# Patient Record
Sex: FEMALE | Race: WHITE | NOT HISPANIC OR LATINO | Employment: OTHER | URBAN - METROPOLITAN AREA
[De-identification: names, ages, dates, MRNs, and addresses within clinical notes are randomized per-mention and may not be internally consistent; named-entity substitution may affect disease eponyms.]

---

## 2017-11-27 ENCOUNTER — NEW PATIENT COMPREHENSIVE (OUTPATIENT)
Dept: URBAN - METROPOLITAN AREA CLINIC 43 | Facility: CLINIC | Age: 57
End: 2017-11-27

## 2017-11-27 DIAGNOSIS — H40.013: ICD-10-CM

## 2017-11-27 DIAGNOSIS — H25.812: ICD-10-CM

## 2017-11-27 DIAGNOSIS — H25.811: ICD-10-CM

## 2017-11-27 PROCEDURE — 92133 CPTRZD OPH DX IMG PST SGM ON: CPT

## 2017-11-27 PROCEDURE — 92015 DETERMINE REFRACTIVE STATE: CPT

## 2017-11-27 PROCEDURE — 99204 OFFICE O/P NEW MOD 45 MIN: CPT

## 2017-11-27 ASSESSMENT — VISUAL ACUITY
OS_SC: 20/60-1
OD_SC: 20/60-1
OS_CC: J1
OS_CC: 20/30-2
OD_CC: J1
OD_CC: 20/40
OS_SC: J1
OD_SC: J1

## 2017-11-27 ASSESSMENT — TONOMETRY
OD_IOP_MMHG: 21
OS_IOP_MMHG: 21

## 2017-12-29 NOTE — PATIENT DISCUSSION
RX given today, but suggested pt F/U with Dr. Balta Levy as set and have him recheck the rX for diplopia, etc.

## 2018-01-03 NOTE — PATIENT DISCUSSION
RX given today, but suggested pt F/U with Dr. Alyson Libman as set and have him recheck the rX for diplopia, etc.

## 2018-04-19 NOTE — PATIENT DISCUSSION
RX given today, but suggested pt F/U with Dr. Giorgi Bowles as set and have him recheck the rX for diplopia, etc.

## 2018-06-29 NOTE — PATIENT DISCUSSION
No signs of physical ocular involvement. Patient has episodes of burning and stinging in OD that may coincide with her episodes of pain in her back. Patient is currently taking a modified dosage of Lyrica. Recommend an NSAID eye drop to help with symptoms when they occur. Prescription given for Ilevro to use as needed when pain occurs.

## 2018-06-29 NOTE — PATIENT DISCUSSION
Discussed presence of ERM. Recommended observation for now. Can consider surgical intervention in the future if the ERM progresses and the patient becomes more symptomatic.

## 2020-01-14 ENCOUNTER — ESTABLISHED COMPREHENSIVE EXAM (OUTPATIENT)
Dept: URBAN - METROPOLITAN AREA CLINIC 43 | Facility: CLINIC | Age: 60
End: 2020-01-14

## 2020-01-14 DIAGNOSIS — H52.7: ICD-10-CM

## 2020-01-14 PROCEDURE — 92015 DETERMINE REFRACTIVE STATE: CPT

## 2020-01-14 PROCEDURE — 92014 COMPRE OPH EXAM EST PT 1/>: CPT

## 2020-01-14 ASSESSMENT — VISUAL ACUITY
OD_CC: 20/20-1
OS_CC: J1-
OS_SC: 20/70-1
OD_BAT: 20/40
OD_CC: J1
OD_SC: 20/40
OS_BAT: 20/40
OS_SC: J2
OS_CC: 20/25-2
OD_SC: J2

## 2020-01-14 ASSESSMENT — TONOMETRY
OD_IOP_MMHG: 18
OS_IOP_MMHG: 20

## 2021-06-09 NOTE — PROCEDURE NOTE: CLINICAL
PROCEDURE NOTE: Punctal Plugs, Silicone #1 OS. Diagnosis: Dry Eye Syndrome. Anesthesia: Topical. Prior to treatment, the risks/benefits/alternatives were discussed. The patient wished to proceed with procedure. One drop of proparacaine was placed and a drop of lidocaine gel was placed over the puncta. 0.6 mm permanent silicone plugs were inserted in LLL. Patient tolerated procedure well. There were no complications. Post procedure instructions given. Janet Ojeda

## 2023-05-03 ENCOUNTER — PREPPED CHART (OUTPATIENT)
Dept: URBAN - METROPOLITAN AREA CLINIC 46 | Facility: CLINIC | Age: 63
End: 2023-05-03

## 2023-06-28 ENCOUNTER — COMPREHENSIVE EXAM (OUTPATIENT)
Dept: URBAN - METROPOLITAN AREA CLINIC 46 | Facility: CLINIC | Age: 63
End: 2023-06-28

## 2023-06-28 DIAGNOSIS — H25.813: ICD-10-CM

## 2023-06-28 DIAGNOSIS — H52.7: ICD-10-CM

## 2023-06-28 DIAGNOSIS — H10.403: ICD-10-CM

## 2023-06-28 DIAGNOSIS — H40.013: ICD-10-CM

## 2023-06-28 DIAGNOSIS — H04.123: ICD-10-CM

## 2023-06-28 PROCEDURE — 92015 DETERMINE REFRACTIVE STATE: CPT

## 2023-06-28 PROCEDURE — 92004 COMPRE OPH EXAM NEW PT 1/>: CPT

## 2023-06-28 ASSESSMENT — VISUAL ACUITY
OD_SC: 20/50
OS_CC: 20/25
OS_CC: J1
OD_CC: J1
OD_SC: J2
OS_SC: 20/40
OS_SC: J2
OD_CC: 20/25

## 2023-06-28 ASSESSMENT — TONOMETRY
OS_IOP_MMHG: 18
OD_IOP_MMHG: 18

## 2024-06-26 ENCOUNTER — PREPPED CHART (OUTPATIENT)
Dept: URBAN - METROPOLITAN AREA CLINIC 46 | Facility: CLINIC | Age: 64
End: 2024-06-26